# Patient Record
Sex: MALE | Race: OTHER | URBAN - METROPOLITAN AREA
[De-identification: names, ages, dates, MRNs, and addresses within clinical notes are randomized per-mention and may not be internally consistent; named-entity substitution may affect disease eponyms.]

---

## 2024-04-20 ENCOUNTER — HOSPITAL ENCOUNTER (EMERGENCY)
Facility: HOSPITAL | Age: 24
Discharge: HOME | End: 2024-04-21
Attending: EMERGENCY MEDICINE
Payer: COMMERCIAL

## 2024-04-20 VITALS
SYSTOLIC BLOOD PRESSURE: 136 MMHG | HEART RATE: 86 BPM | OXYGEN SATURATION: 100 % | DIASTOLIC BLOOD PRESSURE: 68 MMHG | RESPIRATION RATE: 24 BRPM

## 2024-04-20 DIAGNOSIS — F10.929 ALCOHOL INTOXICATION (HCC): Primary | ICD-10-CM

## 2024-04-20 PROCEDURE — 15853 REMOVAL SUTR/STAPL XREQ ANES: CPT | Performed by: EMERGENCY MEDICINE

## 2024-04-20 PROCEDURE — 99284 EMERGENCY DEPT VISIT MOD MDM: CPT | Performed by: EMERGENCY MEDICINE

## 2024-04-20 PROCEDURE — 99284 EMERGENCY DEPT VISIT MOD MDM: CPT

## 2024-04-20 RX ORDER — MIDAZOLAM HYDROCHLORIDE 2 MG/2ML
INJECTION, SOLUTION INTRAMUSCULAR; INTRAVENOUS
Status: DISCONTINUED
Start: 2024-04-20 | End: 2024-04-21 | Stop reason: HOSPADM

## 2024-04-20 RX ORDER — MIDAZOLAM HYDROCHLORIDE 2 MG/2ML
4 INJECTION, SOLUTION INTRAMUSCULAR; INTRAVENOUS ONCE
Status: DISCONTINUED | OUTPATIENT
Start: 2024-04-20 | End: 2024-04-21 | Stop reason: HOSPADM

## 2024-04-20 RX ORDER — OLANZAPINE 10 MG/2ML
INJECTION, POWDER, FOR SOLUTION INTRAMUSCULAR
Status: COMPLETED
Start: 2024-04-20 | End: 2024-04-20

## 2024-04-20 RX ORDER — WATER 10 ML/10ML
INJECTION INTRAMUSCULAR; INTRAVENOUS; SUBCUTANEOUS
Status: COMPLETED
Start: 2024-04-20 | End: 2024-04-20

## 2024-04-20 RX ADMIN — OLANZAPINE 10 MG: 10 INJECTION, POWDER, FOR SOLUTION INTRAMUSCULAR at 21:36

## 2024-04-20 RX ADMIN — WATER 10 ML: 1 INJECTION INTRAMUSCULAR; INTRAVENOUS; SUBCUTANEOUS at 21:36

## 2024-04-21 NOTE — ED PROVIDER NOTES
History  Chief Complaint   Patient presents with    Alcohol Intoxication     Pt brought in via EMS from [place] police dept. He was picked up for alcohol intoxication and public vandalism     24-year-old male patient presents to the ED complaining of alcohol intoxication.  Patient was brought in by police as patient was disruptive and intoxicated publicly.  Patient was threatening to try to bang his head onto the wall. States did drink alcohol. No other drug use. Denies HI or SI.         None       History reviewed. No pertinent past medical history.    History reviewed. No pertinent surgical history.    History reviewed. No pertinent family history.  I have reviewed and agree with the history as documented.    E-Cigarette/Vaping     E-Cigarette/Vaping Substances    Nicotine Yes      Social History     Tobacco Use    Smoking status: Former     Types: Cigarettes   Substance Use Topics    Alcohol use: Yes    Drug use: Yes     Types: Marijuana        Review of Systems   All other systems reviewed and are negative.      Physical Exam  ED Triage Vitals [04/20/24 2128]   Temp Pulse Respirations Blood Pressure SpO2   -- 86 (!) 24 136/68 100 %      Temp src Heart Rate Source Patient Position - Orthostatic VS BP Location FiO2 (%)   -- Monitor Lying Left arm --      Pain Score       --             Orthostatic Vital Signs  Vitals:    04/20/24 2128   BP: 136/68   Pulse: 86   Patient Position - Orthostatic VS: Lying       Physical Exam  Vitals reviewed.   Constitutional:       Appearance: Normal appearance.   HENT:      Head: Normocephalic and atraumatic.      Nose: Nose normal.      Mouth/Throat:      Mouth: Mucous membranes are moist.      Pharynx: Oropharynx is clear.   Eyes:      Extraocular Movements: Extraocular movements intact.      Conjunctiva/sclera: Conjunctivae normal.   Cardiovascular:      Rate and Rhythm: Normal rate and regular rhythm.      Pulses: Normal pulses.      Heart sounds: Normal heart sounds.    Pulmonary:      Effort: Pulmonary effort is normal.      Breath sounds: Normal breath sounds.   Abdominal:      General: Bowel sounds are normal.      Palpations: Abdomen is soft.      Tenderness: There is no abdominal tenderness.   Musculoskeletal:         General: Normal range of motion.      Cervical back: Normal range of motion.   Skin:     General: Skin is warm and dry.   Neurological:      General: No focal deficit present.      Mental Status: He is alert and oriented to person, place, and time. Mental status is at baseline.      Comments: Agitated.          ED Medications  Medications   OLANZapine (ZyPREXA) 10 mg IM injection **ADS Override Pull** (10 mg IM- Deltoid Given 4/20/24 2136)   sterile water injection **ADS Override Pull** (10 mL  Given 4/20/24 2136)       Diagnostic Studies  Results Reviewed       None                   No orders to display         Procedures  Suture removal    Date/Time: 4/20/2024 11:05 PM    Performed by: Ismael Monroe MD  Authorized by: Ismael Monroe MD  Universal Protocol:  Consent: Verbal consent obtained.  Risks and benefits: risks, benefits and alternatives were discussed      Patient location:  ED  Location:     Location:  Head/neck    Head/neck location:  Scalp  Procedure details:     Tools used:  Other (comment)    Wound appearance:  No sign(s) of infection    Number of staples removed:  5  Post-procedure details:     Post-removal:  No dressing applied    Patient tolerance of procedure:  Tolerated well, no immediate complications        ED Course                                       Medical Decision Making  23 y/o male patient BIB police for public intoxication. Patient was disruptive in his shelter cell. On arrival patient aggressive. Asked to calm down and offered zyprexa for patient to calm down. Patient agreed. Patient was able to relax. Patient also had staples that has been in for awhile, that was removed. Signed out to Dr. Fallon pending clinical sobriety.      Risk  Prescription drug management.          Disposition  Final diagnoses:   Alcohol intoxication (HCC)     Time reflects when diagnosis was documented in both MDM as applicable and the Disposition within this note       Time User Action Codes Description Comment    4/20/2024 11:55 PM Ismael Monroe Add [F10.929] Alcohol intoxication (HCC)           ED Disposition       ED Disposition   Discharge    Condition   Stable    Date/Time   Sun Apr 21, 2024  2:31 AM    Comment   Amandeep Chadwick discharge to home/self care.                   Follow-up Information       Follow up With Specialties Details Why Contact Info Additional Information    [facility] Medicine Schedule an appointment as soon as possible for a visit   [address] [facility] [address]  [phone] [facility], [address], [place], [address]   [phone]            There are no discharge medications for this patient.    No discharge procedures on file.    PDMP Review       None             ED Provider  Attending physically available and evaluated Amandeep Chadwick. I managed the patient along with the ED Attending.    Electronically Signed by           Ismael Monroe MD  04/21/24 3412

## 2024-04-21 NOTE — DISCHARGE INSTRUCTIONS
You were seen in the ED for alcohol intoxication. Return to the ED for any worsening symptoms or new symptoms. Follow up with your primary care doctor as soon as possible.

## 2024-04-21 NOTE — ED ATTENDING ATTESTATION
"Final Diagnoses:     1. Alcohol intoxication (HCC)           Juliano CHICAS MD, saw and evaluated the patient. All available labs and X-rays were ordered by me or the resident / non-physician and have been reviewed by myself. I discussed the patient with the resident / non-physician and agree with the resident's / non-physician practitioner's findings and plan as documented in the resident's / non-physician practicitioner's note, except where noted.   At this point, I agree with the current assessment done in the ED.   I was present during key portions of all procedures performed unless otherwise stated.     HPI:  NURSING TRIAGE:    This is a 24 y.o. male presenting for evaluation of likely just ETOH intoxication.  Per police / patient, the police responded to a domestic complaint, found the patient + dad arguing about patient's alcohol use.   The patient was yelling, became beligerent with police.  They kept trying to redirect him.  He kept trying to evade police and run from them.  He was then arrest/cited.  Taken to cell block.  While there, he kept threatening to \"smash his face on the door\" and kept kicking it.  Due to his aggressive behavior while intoxicated, he was brought in for evaluation.    Patient cannot be redirected by myself when attemping to interview, kept yelling at  when he was talking about trying to remove the handcuffs.  Chief Complaint   Patient presents with    Alcohol Intoxication     Pt brought in via EMS from [place] police dept. He was picked up for alcohol intoxication and public vandalism      PHYSICAL: ASSESSMENT + PLAN:   Pertinent: yelling at stuff, moving all extremities.  No dipahoresis  No signs of trauma  Cotninuously yelling.    General: VS reviewed  Appears in NAD  awake, alert.   Well-nourished, well-developed. Appears stated age.   Speaking normally in full sentences.   Head: Normocephalic, atraumatic  Eyes: EOM-I. No diplopia.   No hyphema.   No " subconjunctival hemorrhages.  Symmetrical lids.   ENT: Atraumatic external nose and ears.    MMM  No malocclusion. No stridor. Normal phonation. No drooling. Normal swallowing.   Neck: No JVD.  CV: No pallor noted  Lungs:   No tachypnea  No respiratory distress  Abd: soft nt nd no rebound/guarding  MSK:   FROM spontaneously  Skin: Dry, intact.   Neuro: Awake, alert, GCS15, CN II-XII grossly intact.   Motor grossly intact.  Psychiatric/Behavioral: interacting normally; appropriate mood/affect.    Exam: deferred    Vitals:    04/20/24 2128   BP: 136/68   BP Location: Left arm   Pulse: 86   Resp: (!) 24   SpO2: 100%    ETOH intoxication with aggression  Medications  DC when more sober and not angry.      There are no obvious limitations to social determinants of care.   Nursing note reviewed.   Vitals reviewed.   Orders placed by myself and/or advanced practitioner / resident.    Previous chart was reviewed  No language barrier.   History obtained from patient.    There are no limitations to the history obtained:     Past Medical: Past Surgical:    has no past medical history on file.  has no past surgical history on file.   Social: Cardiac (Echo/Cath)   Social History     Substance and Sexual Activity   Alcohol Use Yes     Social History     Tobacco Use   Smoking Status Former    Types: Cigarettes   Smokeless Tobacco Not on file     Social History     Substance and Sexual Activity   Drug Use Yes    Types: Marijuana    No results found for this or any previous visit.    No results found for this or any previous visit.    No results found for this or any previous visit.     Labs: Imaging:   Labs Reviewed - No data to display No orders to display      Medications: Code Status:   Medications   OLANZapine (ZyPREXA) 10 mg IM injection **ADS Override Pull** (10 mg IM- Deltoid Given 4/20/24 2136)   sterile water injection **ADS Override Pull** (10 mL  Given 4/20/24 2136)    Code Status: No Order  Advance Directive and  "Living Will:      Power of :    POLST:       Orders Placed This Encounter   Procedures    Suture removal     Time reflects when diagnosis was documented in both MDM as applicable and the Disposition within this note       Time User Action Codes Description Comment    4/20/2024 11:55 PM Ismael Monroe Davida Add [F10.929] Alcohol intoxication (HCC)           ED Disposition       ED Disposition   Discharge    Condition   Stable    Date/Time   Sun Apr 21, 2024  2:31 AM    Comment   Amandeep Chadwick discharge to home/self care.                   Follow-up Information       Follow up With Specialties Details Why Contact Info Additional Information    [facility] Medicine Schedule an appointment as soon as possible for a visit   [address] [facility] [address] [phone] [facility], [address] [place], [place], [address] [phone]          There are no discharge medications for this patient.    No discharge procedures on file.  None                        Portions of the record may have been created with voice recognition software. Occasional wrong word or \"sound a like\" substitutions may have occurred due to the inherent limitations of voice recognition software. Read the chart carefully and recognize, using context, where substitutions have occurred.    Electronically signed by:  Juliano Gomez  "